# Patient Record
Sex: FEMALE | Race: WHITE | NOT HISPANIC OR LATINO
[De-identification: names, ages, dates, MRNs, and addresses within clinical notes are randomized per-mention and may not be internally consistent; named-entity substitution may affect disease eponyms.]

---

## 2024-01-26 ENCOUNTER — DASHBOARD ENCOUNTERS (OUTPATIENT)
Age: 39
End: 2024-01-26

## 2024-01-26 ENCOUNTER — LAB OUTSIDE AN ENCOUNTER (OUTPATIENT)
Dept: URBAN - METROPOLITAN AREA CLINIC 23 | Facility: CLINIC | Age: 39
End: 2024-01-26

## 2024-01-26 ENCOUNTER — OFFICE VISIT (OUTPATIENT)
Dept: URBAN - METROPOLITAN AREA CLINIC 23 | Facility: CLINIC | Age: 39
End: 2024-01-26
Payer: COMMERCIAL

## 2024-01-26 VITALS
TEMPERATURE: 97.1 F | DIASTOLIC BLOOD PRESSURE: 83 MMHG | HEIGHT: 65 IN | WEIGHT: 165 LBS | SYSTOLIC BLOOD PRESSURE: 128 MMHG | BODY MASS INDEX: 27.49 KG/M2 | HEART RATE: 76 BPM

## 2024-01-26 DIAGNOSIS — K59.01 CONSTIPATION: ICD-10-CM

## 2024-01-26 DIAGNOSIS — R19.4 BOWEL HABIT CHANGES: ICD-10-CM

## 2024-01-26 PROCEDURE — 99204 OFFICE O/P NEW MOD 45 MIN: CPT | Performed by: INTERNAL MEDICINE

## 2024-01-26 RX ORDER — ARIPIPRAZOLE 2 MG/1
1 TABLET TABLET ORAL ONCE A DAY
Status: ACTIVE | COMMUNITY

## 2024-01-26 RX ORDER — SPIRONOLACTONE 50 MG/1
1 TABLET TABLET, FILM COATED ORAL ONCE A DAY
Status: ACTIVE | COMMUNITY

## 2024-01-26 NOTE — HPI-TODAY'S VISIT:
38-year-old female presents today for evaluation of chronic constipation bloating.   She had history of a longstanding issue of severe constipation, which has persisted for over a decade. She describes having infrequent bowel movements, occurring only every two weeks, and notes increasing discomfort associated with this condition. She has a documented history of gastrointestinal consultations in the years 2020 and 2021.During past consultations, she was prescribed Amitiza, which she reports was effective in managing her symptoms but resulted in nighttime accidents. This side effect has led to her intermittent use of the medication over the past month and a half. The patient has also attempted to alleviate her symptoms with Yousuf, Miralax, and citric acid, all of which were unsuccessful. She has not tried Trulance or Linzess, and she indicates that Senna, which she has used, is no longer effective.She had colonoscopy in the past she was told she may have microscopic colitis 10 years ago.  The patient reports a strong family history of stomach and intestinal cancer. . She denies experiencing any skin rashes, itching, joint pain, or back pain.

## 2024-01-29 LAB
IMMUNOGLOBULIN A: 218
TISSUE TRANSGLUTAMINASE AB, IGA: <1

## 2024-01-30 ENCOUNTER — WEB ENCOUNTER (OUTPATIENT)
Dept: URBAN - METROPOLITAN AREA CLINIC 23 | Facility: CLINIC | Age: 39
End: 2024-01-30

## 2024-01-30 LAB
A/G RATIO: 1.6
ABSOLUTE BASOPHILS: 18
ABSOLUTE EOSINOPHILS: 92
ABSOLUTE LYMPHOCYTES: 2422
ABSOLUTE MONOCYTES: 262
ABSOLUTE NEUTROPHILS: 3306
ALBUMIN: 4.6
ALKALINE PHOSPHATASE: 50
ALMOND (F20) IGE: <0.1
ALT (SGPT): 15
AST (SGOT): 22
BASOPHILS: 0.3
BILIRUBIN, TOTAL: 0.5
BUN/CREATININE RATIO: (no result)
BUN: 8
C-REACTIVE PROTEIN, QUANT: 2.1
CALCIUM: 9.4
CARBON DIOXIDE, TOTAL: 24
CASHEW NUT (F202) IGE: <0.1
CHLORIDE: 101
CLASS: 0
CODFISH (F3) IGE: <0.1
COW'S MILK (F2) IGE: <0.1
CREATININE: 0.74
EGFR: 105
EGG WHITE (F1) IGE: <0.1
EOSINOPHILS: 1.5
GLOBULIN, TOTAL: 2.9
GLUCOSE: 128
HAZELNUT (F17) IGE: <0.1
HEMATOCRIT: 40.6
HEMOGLOBIN: 13.9
INTERPRETATION: (no result)
LYMPHOCYTES: 39.7
MCH: 30.5
MCHC: 34.2
MCV: 89.2
MONOCYTES: 4.3
MPV: 10.2
NEUTROPHILS: 54.2
PEANUT (F13) IGE: <0.1
PLATELET COUNT: 305
POTASSIUM: 3.8
PROTEIN, TOTAL: 7.5
RDW: 12.5
RED BLOOD CELL COUNT: 4.55
SALMON (F41) IGE: <0.1
SCALLOP (F338) IGE: <0.1
SESAME SEED (F10) IGE: <0.1
SHRIMP (F24) IGE: <0.1
SODIUM: 137
SOYBEAN (F14) IGE: <0.1
TSH: 1.79
TUNA (F40) IGE: <0.1
WALNUT (F256) IGE: <0.1
WHEAT (F4) IGE: <0.1
WHITE BLOOD CELL COUNT: 6.1

## 2024-03-25 ENCOUNTER — COLON (OUTPATIENT)
Dept: URBAN - METROPOLITAN AREA SURGERY CENTER 15 | Facility: SURGERY CENTER | Age: 39
End: 2024-03-25

## 2024-03-25 RX ORDER — ARIPIPRAZOLE 2 MG/1
1 TABLET TABLET ORAL ONCE A DAY
Status: ACTIVE | COMMUNITY

## 2024-03-25 RX ORDER — SPIRONOLACTONE 50 MG/1
1 TABLET TABLET, FILM COATED ORAL ONCE A DAY
Status: ACTIVE | COMMUNITY

## 2024-03-25 RX ORDER — LINACLOTIDE 72 UG/1
1 CAPSULE AT LEAST 30 MINUTES BEFORE THE FIRST MEAL OF THE DAY ON AN EMPTY STOMACH CAPSULE, GELATIN COATED ORAL ONCE A DAY
Qty: 90 | Refills: 3 | Status: ACTIVE | COMMUNITY
Start: 2024-02-09 | End: 2025-02-03

## 2025-06-28 ENCOUNTER — TELEPHONE ENCOUNTER (OUTPATIENT)
Dept: URBAN - METROPOLITAN AREA CLINIC 23 | Facility: CLINIC | Age: 40
End: 2025-06-28

## 2025-06-28 ENCOUNTER — LAB OUTSIDE AN ENCOUNTER (OUTPATIENT)
Dept: URBAN - METROPOLITAN AREA CLINIC 23 | Facility: CLINIC | Age: 40
End: 2025-06-28